# Patient Record
Sex: MALE | Race: WHITE | NOT HISPANIC OR LATINO | Employment: FULL TIME
[De-identification: names, ages, dates, MRNs, and addresses within clinical notes are randomized per-mention and may not be internally consistent; named-entity substitution may affect disease eponyms.]

---

## 2017-06-10 ENCOUNTER — HEALTH MAINTENANCE LETTER (OUTPATIENT)
Age: 22
End: 2017-06-10

## 2017-10-10 DIAGNOSIS — F41.9 ANXIETY: ICD-10-CM

## 2017-10-10 RX ORDER — BUPROPION HYDROCHLORIDE 150 MG/1
TABLET ORAL
Qty: 90 TABLET | Refills: 3 | OUTPATIENT
Start: 2017-10-10

## 2017-10-10 NOTE — TELEPHONE ENCOUNTER
This was sent for a year on 12/26/16.  Pt should have enough until 12/2017  Pt needs appt  Tisha Yi RN, BSN

## 2017-10-10 NOTE — TELEPHONE ENCOUNTER
Pending Prescriptions:                       Disp   Refills    buPROPion (WELLBUTRIN XL) 150 MG 24 hr ta*90 tab*2            Sig: TAKE 1 TABLET (150 MG) BY MOUTH EVERY MORNING    LAST OV 07/19/2016         Last Written Prescription Date: 12/26/2016  Last Fill Quantity: 90; # refills: 3  Last Office Visit with G, P or [facility] prescribing provider:  07/19/2016        Last PHQ-9 score on record=   PHQ-9 SCORE 7/17/2015   Total Score 6       Lab Results   Component Value Date    AST 31 09/08/2009     Lab Results   Component Value Date    ALT 17 09/08/2009     Naveed BARRERAT

## 2017-10-12 ENCOUNTER — TELEPHONE (OUTPATIENT)
Dept: FAMILY MEDICINE | Facility: CLINIC | Age: 22
End: 2017-10-12

## 2017-10-12 DIAGNOSIS — F41.9 ANXIETY: ICD-10-CM

## 2017-10-12 RX ORDER — BUPROPION HYDROCHLORIDE 150 MG/1
150 TABLET ORAL EVERY MORNING
Qty: 90 TABLET | Refills: 3 | Status: SHIPPED | OUTPATIENT
Start: 2017-10-12 | End: 2018-07-21

## 2018-02-15 DIAGNOSIS — F41.9 ANXIETY: ICD-10-CM

## 2018-02-15 RX ORDER — BUPROPION HYDROCHLORIDE 150 MG/1
TABLET ORAL
Qty: 90 TABLET | Refills: 3 | Status: SHIPPED | OUTPATIENT
Start: 2018-02-15 | End: 2018-07-21

## 2018-07-21 ENCOUNTER — OFFICE VISIT (OUTPATIENT)
Dept: FAMILY MEDICINE | Facility: CLINIC | Age: 23
End: 2018-07-21
Payer: COMMERCIAL

## 2018-07-21 DIAGNOSIS — Z23 NEED FOR PROPHYLACTIC VACCINATION WITH TETANUS-DIPHTHERIA (TD): ICD-10-CM

## 2018-07-21 DIAGNOSIS — Z13.1 SCREENING FOR DIABETES MELLITUS: ICD-10-CM

## 2018-07-21 DIAGNOSIS — F41.9 ANXIETY: ICD-10-CM

## 2018-07-21 DIAGNOSIS — Z00.00 ROUTINE GENERAL MEDICAL EXAMINATION AT A HEALTH CARE FACILITY: Primary | ICD-10-CM

## 2018-07-21 DIAGNOSIS — Z13.0 SCREENING FOR DISORDER OF BLOOD AND BLOOD-FORMING ORGANS: ICD-10-CM

## 2018-07-21 DIAGNOSIS — Z13.220 LIPID SCREENING: ICD-10-CM

## 2018-07-21 LAB
ALBUMIN SERPL-MCNC: 4.8 G/DL (ref 3.4–5)
ALP SERPL-CCNC: 62 U/L (ref 40–150)
ALT SERPL W P-5'-P-CCNC: 15 U/L (ref 0–70)
ANION GAP SERPL CALCULATED.3IONS-SCNC: 8 MMOL/L (ref 3–14)
AST SERPL W P-5'-P-CCNC: 9 U/L (ref 0–45)
BILIRUB SERPL-MCNC: 0.7 MG/DL (ref 0.2–1.3)
BUN SERPL-MCNC: 20 MG/DL (ref 7–30)
CALCIUM SERPL-MCNC: 9.5 MG/DL (ref 8.5–10.1)
CHLORIDE SERPL-SCNC: 104 MMOL/L (ref 94–109)
CHOLEST SERPL-MCNC: 179 MG/DL
CO2 SERPL-SCNC: 29 MMOL/L (ref 20–32)
CREAT SERPL-MCNC: 1.17 MG/DL (ref 0.66–1.25)
ERYTHROCYTE [DISTWIDTH] IN BLOOD BY AUTOMATED COUNT: 12.4 % (ref 10–15)
GFR SERPL CREATININE-BSD FRML MDRD: 78 ML/MIN/1.7M2
GLUCOSE SERPL-MCNC: 90 MG/DL (ref 70–99)
HCT VFR BLD AUTO: 44.3 % (ref 40–53)
HDLC SERPL-MCNC: 56 MG/DL
HGB BLD-MCNC: 15.5 G/DL (ref 13.3–17.7)
LDLC SERPL CALC-MCNC: 109 MG/DL
MCH RBC QN AUTO: 30.1 PG (ref 26.5–33)
MCHC RBC AUTO-ENTMCNC: 35 G/DL (ref 31.5–36.5)
MCV RBC AUTO: 86 FL (ref 78–100)
NONHDLC SERPL-MCNC: 123 MG/DL
PLATELET # BLD AUTO: 258 10E9/L (ref 150–450)
POTASSIUM SERPL-SCNC: 4.6 MMOL/L (ref 3.4–5.3)
PROT SERPL-MCNC: 8 G/DL (ref 6.8–8.8)
RBC # BLD AUTO: 5.15 10E12/L (ref 4.4–5.9)
SODIUM SERPL-SCNC: 141 MMOL/L (ref 133–144)
TRIGL SERPL-MCNC: 68 MG/DL
WBC # BLD AUTO: 6 10E9/L (ref 4–11)

## 2018-07-21 PROCEDURE — 90715 TDAP VACCINE 7 YRS/> IM: CPT | Performed by: PHYSICIAN ASSISTANT

## 2018-07-21 PROCEDURE — 90471 IMMUNIZATION ADMIN: CPT | Performed by: PHYSICIAN ASSISTANT

## 2018-07-21 PROCEDURE — 85027 COMPLETE CBC AUTOMATED: CPT | Performed by: PHYSICIAN ASSISTANT

## 2018-07-21 PROCEDURE — 99395 PREV VISIT EST AGE 18-39: CPT | Mod: 25 | Performed by: PHYSICIAN ASSISTANT

## 2018-07-21 PROCEDURE — 80061 LIPID PANEL: CPT | Performed by: PHYSICIAN ASSISTANT

## 2018-07-21 PROCEDURE — 36415 COLL VENOUS BLD VENIPUNCTURE: CPT | Performed by: PHYSICIAN ASSISTANT

## 2018-07-21 PROCEDURE — 80053 COMPREHEN METABOLIC PANEL: CPT | Performed by: PHYSICIAN ASSISTANT

## 2018-07-21 RX ORDER — BUPROPION HYDROCHLORIDE 150 MG/1
TABLET ORAL
Qty: 90 TABLET | Refills: 3 | Status: SHIPPED | OUTPATIENT
Start: 2018-07-21 | End: 2019-02-04

## 2018-07-21 ASSESSMENT — ANXIETY QUESTIONNAIRES
3. WORRYING TOO MUCH ABOUT DIFFERENT THINGS: NOT AT ALL
7. FEELING AFRAID AS IF SOMETHING AWFUL MIGHT HAPPEN: SEVERAL DAYS
GAD7 TOTAL SCORE: 3
IF YOU CHECKED OFF ANY PROBLEMS ON THIS QUESTIONNAIRE, HOW DIFFICULT HAVE THESE PROBLEMS MADE IT FOR YOU TO DO YOUR WORK, TAKE CARE OF THINGS AT HOME, OR GET ALONG WITH OTHER PEOPLE: NOT DIFFICULT AT ALL
2. NOT BEING ABLE TO STOP OR CONTROL WORRYING: NOT AT ALL
6. BECOMING EASILY ANNOYED OR IRRITABLE: SEVERAL DAYS
5. BEING SO RESTLESS THAT IT IS HARD TO SIT STILL: SEVERAL DAYS
1. FEELING NERVOUS, ANXIOUS, OR ON EDGE: NOT AT ALL

## 2018-07-21 ASSESSMENT — PATIENT HEALTH QUESTIONNAIRE - PHQ9: 5. POOR APPETITE OR OVEREATING: NOT AT ALL

## 2018-07-21 NOTE — PROGRESS NOTES
SUBJECTIVE:   CC: Sundeep Al is an 23 year old male who presents for preventative health visit.     Physical   Annual:     Getting at least 3 servings of Calcium per day:  Yes    Bi-annual eye exam:  NO    Dental care twice a year:  NO    Sleep apnea or symptoms of sleep apnea:  None    Diet:  Regular (no restrictions)    Frequency of exercise:  1 day/week    Duration of exercise:  15-30 minutes    Taking medications regularly:  Yes    Medication side effects:  None    Additional concerns today:  YES        Concern - blister  Onset: x 6 days    Description:   Blood blister from rock climbing    Intensity: mild    Progression of Symptoms:  same    Accompanying Signs & Symptoms:  none  Therapies Tried and outcome: bandage      Depression and Anxiety Follow-Up    Status since last visit: Improved     Other associated symptoms:None    Complicating factors:     Significant life event: No     Current substance abuse: none    No flowsheet data found.  MARYANA-7 SCORE 6/26/2015 7/17/2015   Total Score 10 6       PHQ-9  English  PHQ-9   Any Language  MARYANA-7  Suicide Assessment Five-step Evaluation and Treatment (SAFE-T)      Amount of exercise or physical activity: rock climbing;     Problems taking medications regularly: No    Medication side effects: none    Diet: regular (no restrictions)              Today's PHQ-2 Score:   PHQ-2 ( 1999 Pf[employer]) 7/21/2018   Q1: Little interest or pleasure in doing things 0   Q2: Feeling down, depressed or hopeless 0   PHQ-2 Score 0   Q1: Little interest or pleasure in doing things Not at all   Q2: Feeling down, depressed or hopeless Not at all   PHQ-2 Score 0       Abuse: Current or Past(Physical, Sexual or Emotional)- No  Do you feel safe in your environment - Yes    Social History   Substance Use Topics     Smoking status: Never Smoker     Smokeless tobacco: Never Used     Alcohol use No     Alcohol Use 7/21/2018   If you drink alcohol do you typically have greater than 3 drinks per day  OR greater than 7 drinks per week? No       Last PSA: No results found for: PSA    Reviewed orders with patient. Reviewed health maintenance and updated orders accordingly - Yes  BP Readings from Last 3 Encounters:   07/19/16 128/70   07/17/15 120/63   06/26/15 100/60    Wt Readings from Last 3 Encounters:   07/19/16 126 lb 3.2 oz (57.2 kg)   07/17/15 125 lb 9.6 oz (57 kg) (8 %)*   06/26/15 128 lb 3.2 oz (58.2 kg) (11 %)*     * Growth percentiles are based on [facility] 2-20 Years data.                    Reviewed and updated as needed this visit by clinical staff         Reviewed and updated as needed this visit by Provider            Review of Systems  CONSTITUTIONAL: NEGATIVE for fever, chills, change in weight  INTEGUMENTARY/SKIN: NEGATIVE for worrisome rashes, moles or lesions  EYES: NEGATIVE for vision changes or irritation  ENT: NEGATIVE for ear, mouth and throat problems  RESP: NEGATIVE for significant cough or SOB  CV: NEGATIVE for chest pain, palpitations or peripheral edema  GI: NEGATIVE for nausea, abdominal pain, heartburn, or change in bowel habits   male: negative for dysuria, hematuria, decreased urinary stream, erectile dysfunction, urethral discharge  MUSCULOSKELETAL: NEGATIVE for significant arthralgias or myalgia  NEURO: NEGATIVE for weakness, dizziness or paresthesias  PSYCHIATRIC: NEGATIVE for changes in mood or affect    OBJECTIVE:   There were no vitals taken for this visit.    Physical Exam  GENERAL: healthy, alert and no distress  EYES: Eyes grossly normal to inspection, PERRL and conjunctivae and sclerae normal  HENT: ear canals and TM's normal, nose and mouth without ulcers or lesions  NECK: no adenopathy, no asymmetry, masses, or scars and thyroid normal to palpation  RESP: lungs clear to auscultation - no rales, rhonchi or wheezes  CV: regular rate and rhythm, normal S1 S2, no S3 or S4, no murmur, click or rub, no peripheral edema and peripheral pulses strong  ABDOMEN: soft, nontender, no  "hepatosplenomegaly, no masses and bowel sounds normal  MS: no gross musculoskeletal defects noted, no edema  SKIN: no suspicious lesions or rashes  NEURO: Normal strength and tone, mentation intact and speech normal  PSYCH: mentation appears normal, affect normal/bright  LYMPH: no cervical, supraclavicular, axillary, or inguinal adenopathy    Diagnostic Test Results:  none     ASSESSMENT/PLAN:   1. Routine general medical examination at a health care facility      2. Need for prophylactic vaccination with tetanus-diphtheria (TD)    - TDAP VACCINE (ADACEL)  -      ADMIN VACCINE, FIRST    3. Anxiety  Stable and doing well   - buPROPion (WELLBUTRIN XL) 150 MG 24 hr tablet; TAKE 1 TABLET (150 MG) BY MOUTH EVERY MORNING  Dispense: 90 tablet; Refill: 3    4. Screening for diabetes mellitus    - Comprehensive metabolic panel    5. Lipid screening    - Lipid panel reflex to direct LDL Fasting    6. Screening for disorder of blood and blood-forming organs    - CBC with platelets    COUNSELING:   Reviewed preventive health counseling, as reflected in patient instructions       Regular exercise       Healthy diet/nutrition       Vision screening       Hearing screening       Aspirin Prophylaxsis    BP Readings from Last 1 Encounters:   07/19/16 128/70     Estimated body mass index is 18.5 kg/(m^2) as calculated from the following:    Height as of 7/19/16: 5' 9.25\" (1.759 m).    Weight as of 7/19/16: 126 lb 3.2 oz (57.2 kg).           reports that he has never smoked. He has never used smokeless tobacco.      Counseling Resources:  ATP IV Guidelines  Pooled Cohorts Equation Calculator  FRAX Risk Assessment  ICSI Preventive Guidelines  Dietary Guidelines for Americans, 2010  USDA's MyPlate  ASA Prophylaxis  Lung CA Screening    Ramona Ann Aaseby-Aguilera, PA-C  [facility]  Answers for HPI/ROS submitted by the patient on 7/21/2018   PHQ-2 Score: 0    "

## 2018-07-21 NOTE — PATIENT INSTRUCTIONS
Preventive Health Recommendations  Male Ages 21 - 25     Yearly exam:             See your health care provider every year in order to  o   Review health changes.   o   Discuss preventive care.    o   Review your medicines if your doctor has prescribed any.    You should be tested each year for STDs (sexually transmitted diseases).     Talk to your provider about cholesterol testing.      If you are at risk for diabetes, you should have a diabetes test (fasting glucose).    Shots: Get a flu shot each year. Get a tetanus shot every 10 years.     Nutrition:    Eat at least 5 servings of fruits and vegetables daily.     Eat whole-grain bread, whole-wheat pasta and brown rice instead of white grains and rice.     Get adequate calcium and Vitamin D.     Lifestyle    Exercise for at least 150 minutes a week (30 minutes a day, 5 days a week). This will help you control your weight and prevent disease.     Limit alcohol to one drink per day.     No smoking.     Wear sunscreen to prevent skin cancer.     See your dentist every six months for an exam and cleaning.

## 2018-07-21 NOTE — LETTER
"August 3, 2018      Sundeep Al  [address] [facility] [address]        Dear ,    We are writing to inform you of your test results. The results of your recent total cholesterol test were within normal limits.  Your total cholesterol should be less than 200.     The primary goal of therapy is the LDL or \"bad\" cholesterol.  Your LDL level was within normal limits.  Your LDL goal based on risk factors (i.e. smoking, family history, high blood pressure, low HDL cholesterol) and age is 160.     Your HDL, or \"good\" cholesterol is normal. Your goal is an HDL level above 40 if you are male and 50 if you are female     Triglycerides are another cholesterol component that is associated with heart disease. Normal triglycerides are less than 150. Your triglyceride level is normal.     I would recommend: no changes in current regimen. The rest of your labs are within acceptable limits.     Resulted Orders   Comprehensive metabolic panel   Result Value Ref Range    Sodium 141 133 - 144 mmol/L    Potassium 4.6 3.4 - 5.3 mmol/L    Chloride 104 94 - 109 mmol/L    Carbon Dioxide 29 20 - 32 mmol/L    Anion Gap 8 3 - 14 mmol/L    Glucose 90 70 - 99 mg/dL      Comment:      Fasting specimen    Urea Nitrogen 20 7 - 30 mg/dL    Creatinine 1.17 0.66 - 1.25 mg/dL    GFR Estimate 78 >60 mL/min/1.7m2      Comment:      Non  GFR Calc    GFR Estimate If Black >90 >60 mL/min/1.7m2      Comment:       GFR Calc    Calcium 9.5 8.5 - 10.1 mg/dL    Bilirubin Total 0.7 0.2 - 1.3 mg/dL    Albumin 4.8 3.4 - 5.0 g/dL    Protein Total 8.0 6.8 - 8.8 g/dL    Alkaline Phosphatase 62 40 - 150 U/L    ALT 15 0 - 70 U/L    AST 9 0 - 45 U/L   Lipid panel reflex to direct LDL Fasting   Result Value Ref Range    Cholesterol 179 <200 mg/dL    Triglycerides 68 <150 mg/dL      Comment:      Fasting specimen    HDL Cholesterol 56 >39 mg/dL    LDL Cholesterol Calculated 109 (H) <100 mg/dL      Comment:      " Above desirable:  100-129 mg/dl  Borderline High:  130-159 mg/dL  High:             160-189 mg/dL  Very high:       >189 mg/dl      Non HDL Cholesterol 123 <130 mg/dL   CBC with platelets   Result Value Ref Range    WBC 6.0 4.0 - 11.0 10e9/L    RBC Count 5.15 4.4 - 5.9 10e12/L    Hemoglobin 15.5 13.3 - 17.7 g/dL    Hematocrit 44.3 40.0 - 53.0 %    MCV 86 78 - 100 fl    MCH 30.1 26.5 - 33.0 pg    MCHC 35.0 31.5 - 36.5 g/dL    RDW 12.4 10.0 - 15.0 %    Platelet Count 258 150 - 450 10e9/L       If you have any questions or concerns, please call the clinic at the number listed above.       Sincerely,            Ramona Ann Aaseby-Aguilera, PA-C

## 2018-07-21 NOTE — MR AVS SNAPSHOT
After Visit Summary   7/21/2018    Sundeep Al    MRN: [id number]           Patient Information     Date Of Birth          1995        Visit Information        Provider Department      7/21/2018 8:30 AM Aaseby-Aguilera, Ramona Ann, PA-C [facility]        Today's Diagnoses     Routine general medical examination at a health care facility    -  1    Need for prophylactic vaccination with tetanus-diphtheria (TD)        Anxiety        Screening for diabetes mellitus        Lipid screening        Screening for disorder of blood and blood-forming organs          Care Instructions      Preventive Health Recommendations  Male Ages 21 - 25     Yearly exam:             See your health care provider every year in order to  o   Review health changes.   o   Discuss preventive care.    o   Review your medicines if your doctor has prescribed any.    You should be tested each year for STDs (sexually transmitted diseases).     Talk to your provider about cholesterol testing.      If you are at risk for diabetes, you should have a diabetes test (fasting glucose).    Shots: Get a flu shot each year. Get a tetanus shot every 10 years.     Nutrition:    Eat at least 5 servings of fruits and vegetables daily.     Eat whole-grain bread, whole-wheat pasta and brown rice instead of white grains and rice.     Get adequate calcium and Vitamin D.     Lifestyle    Exercise for at least 150 minutes a week (30 minutes a day, 5 days a week). This will help you control your weight and prevent disease.     Limit alcohol to one drink per day.     No smoking.     Wear sunscreen to prevent skin cancer.     See your dentist every six months for an exam and cleaning.             Follow-ups after your visit        Follow-up notes from your care team     Return in about 1 year (around 7/21/2019) for Physical Exam.      Who to contact     If you have questions or need follow up information about today's clinic  visit or your schedule please contact [facility] directly at [phone].  Normal or non-critical lab and imaging results will be communicated to you by MyChart, letter or phone within 4 business days after the clinic has received the results. If you do not hear from us within 7 days, please contact the clinic through MyChart or phone. If you have a critical or abnormal lab result, we will notify you by phone as soon as possible.  Submit refill requests through [employer] or call your pharmacy and they will forward the refill request to us. Please allow 3 business days for your refill to be completed.          Additional Information About Your Visit        Care EveryWhere ID     This is your Care EveryWhere ID. This could be used by other organizations to access your [place] medical records  [id number]         Blood Pressure from Last 3 Encounters:   07/19/16 128/70   07/17/15 120/63   06/26/15 100/60    Weight from Last 3 Encounters:   07/19/16 126 lb 3.2 oz (57.2 kg)   07/17/15 125 lb 9.6 oz (57 kg) (8 %)*   06/26/15 128 lb 3.2 oz (58.2 kg) (11 %)*     * Growth percentiles are based on CDC 2-20 Years data.              We Performed the Following          ADMIN VACCINE, FIRST     CBC with platelets     Comprehensive metabolic panel     Lipid panel reflex to direct LDL Fasting     TDAP VACCINE (ADACEL)          Today's Medication Changes          These changes are accurate as of 7/21/18  8:47 AM.  If you have any questions, ask your nurse or doctor.               These medicines have changed or have updated prescriptions.        Dose/Directions    buPROPion 150 MG 24 hr tablet   Commonly known as:  WELLBUTRIN XL   This may have changed:  Another medication with the same name was removed. Continue taking this medication, and follow the directions you see here.   Used for:  Anxiety        TAKE 1 TABLET (150 MG) BY MOUTH EVERY MORNING   Quantity:  90 tablet   Refills:  3            Where to get your  medicines      These medications were sent to [facility] [address] [place] [facility] [address] [facility]  [address] [facility] [address]    Hours:  Tech issues with their phone system Phone:  [phone]     buPROPion 150 MG 24 hr tablet                Primary Care Provider Office Phone # Fax #    Neelam Ann Aaseby-Aguilera, PA-C [phone] [address]  [facility] [address]        Equal Access to Services     KEVON HAYNES : Hadii aad ku hadasho Soomaali, waaxda luqadaha, qaybta kaalmada adeegyada, waxay idiin hayaan adeeg kharash lajohn . [facility] [phone].    ATENCIÓN: Si habla español, tiene a maldonado disposición servicios gratuitos de asistencia lingüística. [facility] [phone].    We comply with applicable federal civil rights laws and [place] laws. We do not discriminate on the basis of race, color, national origin, age, disability, sex, sexual orientation, or gender identity.            Thank you!     Thank you for choosing [facility]  for your care. Our goal is always to provide you with excellent care. Hearing back from our patients is one way we can continue to improve our services. Please take a few minutes to complete the written survey that you may receive in the mail after your visit with us. Thank you!             Your Updated Medication List - Protect others around you: Learn how to safely use, store and throw away your medicines at www.disposemymeds.org.          This list is accurate as of 7/21/18  8:47 AM.  Always use your most recent med list.                   Brand Name Dispense Instructions for use Diagnosis    buPROPion 150 MG 24 hr tablet    WELLBUTRIN XL    90 tablet    TAKE 1 TABLET (150 MG) BY MOUTH EVERY MORNING    Anxiety

## 2018-07-22 ASSESSMENT — ANXIETY QUESTIONNAIRES: GAD7 TOTAL SCORE: 3

## 2018-07-22 ASSESSMENT — PATIENT HEALTH QUESTIONNAIRE - PHQ9: SUM OF ALL RESPONSES TO PHQ QUESTIONS 1-9: 5

## 2018-08-03 NOTE — PROGRESS NOTES
"Dear Sundeep,    It was a pleasure to see you at your recent visit.      Patient Active Problem List:     Growth failure     Mild major depression (H)     Anxiety        The results of your recent total cholesterol test were within normal limits.  Your total cholesterol should be less than 200.    The primary goal of therapy is the LDL or \"bad\" cholesterol.  Your LDL level was within normal limits.  Your LDL goal based on risk factors (i.e. smoking, family history, high blood pressure, low HDL cholesterol) and age is 160.    Your HDL, or \"good\" cholesterol is normal.  Your goal is an HDL level above 40 if you are male and 50 if you are female    Triglycerides are another cholesterol component that is associated with heart disease.  Normal triglycerides are less than 150.  Your   triglyceride level is normal.    I would recommend: no changes in current regimen.        The rest of your labs are within acceptable limits :     Results for orders placed or performed in visit on 07/21/18  -Comprehensive metabolic panel       Result                                            Value                         Ref Range                       Sodium                                            141                           133 - 144 mmol/L                Potassium                                         4.6                           3.4 - 5.3 mmol/L                Chloride                                          104                           94 - 109 mmol/L                 Carbon Dioxide                                    29                            20 - 32 mmol/L                  Anion Gap                                         8                             3 - 14 mmol/L                   Glucose                                           90                            70 - 99 mg/dL                   Urea Nitrogen                                     20                            7 - 30 mg/dL                    Creatinine          "                               1.17                          0.66 - 1.25 mg/dL               GFR Estimate                                      78                            >60 mL/min/1.7m2                GFR Estimate If Black                             >90                           >60 mL/min/1.7m2                Calcium                                           9.5                           8.5 - 10.1 mg/dL                Bilirubin Total                                   0.7                           0.2 - 1.3 mg/dL                 Albumin                                           4.8                           3.4 - 5.0 g/dL                  Protein Total                                     8.0                           6.8 - 8.8 g/dL                  Alkaline Phosphatase                              62                            40 - 150 U/L                    ALT                                               15                            0 - 70 U/L                      AST                                               9                             0 - 45 U/L                 -Lipid panel reflex to direct LDL Fasting       Result                                            Value                         Ref Range                       Cholesterol                                       179                           <200 mg/dL                      Triglycerides                                     68                            <150 mg/dL                      HDL Cholesterol                                   56                            >39 mg/dL                       LDL Cholesterol Calculated                        109 (H)                       <100 mg/dL                      Non HDL Cholesterol                               123                           <130 mg/dL                 -CBC with platelets       Result                                            Value                         Ref Range                        WBC                                               6.0                           4.0 - 11.0 10e9/L               RBC Count                                         5.15                          4.4 - 5.9 10e12/L               Hemoglobin                                        15.5                          13.3 - 17.7 g/dL                Hematocrit                                        44.3                          40.0 - 53.0 %                   MCV                                               86                            78 - 100 fl                     MCH                                               30.1                          26.5 - 33.0 pg                  MCHC                                              35.0                          31.5 - 36.5 g/dL                RDW                                               12.4                          10.0 - 15.0 %                   Platelet Count                                    258                           150 - 450 10e9/L               Thank you for choosing [facility]. We appreciate the opportunity to serve   you and look forward to supporting your healthcare needs in the future.    If you have any questions or concerns, please contact us at [phone]      Sincerely,        Ramona Aaseby-Aguilera PA-C          TEST DESCRIPTIONS   CBC (Complete Blood Coun  t) includes hemoglobin, hematocrit, white blood cells, etc. This test can be used to detect anemia, infection, and abnormalities in blood cells.   Cholesterol is one of the blood fats (lipids) and is the building block used by the body for cell wall and   hormone production. Increased levels of cholesterol have been proven to directly contribute to heart disease and strokes.   Triglycerides are one of the blood fats (lipids) and are thought to be associated with heart disease. If you have had anything to   eat or drink other than water for 12 hours before the test and your level is high,  "you should have the test repeated after a 12 hour fast. Abnormally high results may also be associated with diabetes, kidney and liver diseases.   LDL is the low density l  ipoprotein component of the cholesterol. It is the harmful substance that deposits cholesterol on the artery walls contributing to heart attacks and strokes. A high LDL level is associated with higher risk of coronary heart disease.   HDL stands for high   density lipoprotein and refers to the so-called \"good\" cholesterol. HDL picks up excess cholesterol in the bloodstream and carries it back to the liver for disposal. Individuals with higher than average HDL seem to have a lower risk of coronary disease.   Vigorous exercise will help increase the blood levels of HDL.   Risk Factor (Total cholesterol/HDL) is a commonly used ratio for cardiac risk assessment. Less than 5.0 for men and 4.4 for women is ideal.   Sodium is an electrolyte useful in diagnosis of   dehydration, diabetes, hypertension, or other diseases involving electrolyte imbalance. It also preserves the balance between calcium and potassium to maintain normal heart action and equilibrium of the body.   Potassium is also an electrolyte that work  s with sodium to regulate the body's water balance and normalize heart rhythm.   Glucose is a measure of blood sugar and is one of the tests for diabetes. If you have not been fasting, your level will often be high. A low glucose level may be a cause of   weakness or dizziness. Blood sugar ranks with cholesterol as a causative factor in arteriosclerosis and heart attacks.   BUN & Creatinine are waste products excreted by the kidneys. A high BUN can be related to a high protein diet, heavy exercise, fever   and infections, dehydration, kidney stones, and kidney disease. Creatinine elevation is less dependent on diet or exercise and better represents kidney impairment. Low values are not generally significant.   Calcium is a mineral in the " blood controlled b  y the parathyroid glands and kidneys. It is important in the formation of bone, in muscle and nerve function, and in blood clotting. Disease of the parathyroid gland, diseased bones or kidneys, or defective absorption of calcium may cause abnormal levels   from the intestine.   ALT, AST, Alk Phos are liver tests. Enzymes found in the liver as well as skeletal and cardiac muscle. Elevations can often be seen in alcoholism, liver or heart disease. Slightly abnormal values are not considered significant.   T  SH stands for Thyroid Stimulating Hormone. A sensitive test used to determine how the thyroid gland is functioning. The thyroid gland produces hormones that control the body's metabolism. When too few hormones are produced (increased TSH), hypothyroidism   occurs which can cause fatigue, sensitivity to cold, and weight gain - an overall slowing down of bodily functions. When too many thyroid hormones are produces (or decreased TSH), it creates a condition call hyperthyroidism (such as Graves' disease) whi  ch causes rapid heartbeat, weight loss, and dizziness, among other symptoms.   PSA stands for Prostate Specific Antigen. Elevated levels of PSA can increase with trauma, infection, inflammation, or disease processes in the prostate such as BPH (Benigh Pr  ostatic Hypertrophy) or cancer.   Glycohemoglobin or HgBA1C is a 3-month average of blood sugar.

## 2019-02-04 DIAGNOSIS — F41.9 ANXIETY: ICD-10-CM

## 2019-02-04 RX ORDER — BUPROPION HYDROCHLORIDE 150 MG/1
TABLET ORAL
Qty: 90 TABLET | Refills: 3 | Status: SHIPPED | OUTPATIENT
Start: 2019-02-04 | End: 2020-02-29

## 2019-04-24 ENCOUNTER — TELEPHONE (OUTPATIENT)
Dept: FAMILY MEDICINE | Facility: CLINIC | Age: 24
End: 2019-04-24

## 2019-04-24 DIAGNOSIS — Z29.89 NEED FOR MALARIA PROPHYLAXIS: Primary | ICD-10-CM

## 2019-04-24 NOTE — TELEPHONE ENCOUNTER
Pt will be traveling and request RX for milaria    Please sign if appropriate.     Enriqueta Osborn RN

## 2019-04-26 RX ORDER — ATOVAQUONE AND PROGUANIL HYDROCHLORIDE 250; 100 MG/1; MG/1
1 TABLET, FILM COATED ORAL DAILY
Qty: 21 TABLET | Refills: 0 | Status: SHIPPED | OUTPATIENT
Start: 2019-04-26 | End: 2021-05-20

## 2020-02-29 ENCOUNTER — OFFICE VISIT (OUTPATIENT)
Dept: FAMILY MEDICINE | Facility: CLINIC | Age: 25
End: 2020-02-29
Payer: COMMERCIAL

## 2020-02-29 VITALS
OXYGEN SATURATION: 98 % | WEIGHT: 145.8 LBS | HEART RATE: 84 BPM | HEIGHT: 70 IN | RESPIRATION RATE: 16 BRPM | BODY MASS INDEX: 20.87 KG/M2 | SYSTOLIC BLOOD PRESSURE: 134 MMHG | DIASTOLIC BLOOD PRESSURE: 75 MMHG | TEMPERATURE: 98 F

## 2020-02-29 DIAGNOSIS — Z00.00 ROUTINE GENERAL MEDICAL EXAMINATION AT A HEALTH CARE FACILITY: Primary | ICD-10-CM

## 2020-02-29 DIAGNOSIS — F41.9 ANXIETY: ICD-10-CM

## 2020-02-29 PROCEDURE — 90686 IIV4 VACC NO PRSV 0.5 ML IM: CPT | Performed by: PHYSICIAN ASSISTANT

## 2020-02-29 PROCEDURE — 90471 IMMUNIZATION ADMIN: CPT | Performed by: PHYSICIAN ASSISTANT

## 2020-02-29 PROCEDURE — 99395 PREV VISIT EST AGE 18-39: CPT | Mod: 25 | Performed by: PHYSICIAN ASSISTANT

## 2020-02-29 ASSESSMENT — ENCOUNTER SYMPTOMS
PARESTHESIAS: 0
HEMATURIA: 0
HEARTBURN: 0
EYE PAIN: 0
FREQUENCY: 0
PALPITATIONS: 0
NERVOUS/ANXIOUS: 0
CHILLS: 0
DYSURIA: 0
MYALGIAS: 0
ARTHRALGIAS: 0
ABDOMINAL PAIN: 0
CONSTIPATION: 0
JOINT SWELLING: 0
FEVER: 0
HEADACHES: 0
COUGH: 0
WEAKNESS: 0
DIZZINESS: 0
SORE THROAT: 0
HEMATOCHEZIA: 0
NAUSEA: 0
SHORTNESS OF BREATH: 0
DIARRHEA: 0

## 2020-02-29 ASSESSMENT — ANXIETY QUESTIONNAIRES
2. NOT BEING ABLE TO STOP OR CONTROL WORRYING: NOT AT ALL
7. FEELING AFRAID AS IF SOMETHING AWFUL MIGHT HAPPEN: NOT AT ALL
1. FEELING NERVOUS, ANXIOUS, OR ON EDGE: NOT AT ALL
6. BECOMING EASILY ANNOYED OR IRRITABLE: SEVERAL DAYS
GAD7 TOTAL SCORE: 1
5. BEING SO RESTLESS THAT IT IS HARD TO SIT STILL: NOT AT ALL
IF YOU CHECKED OFF ANY PROBLEMS ON THIS QUESTIONNAIRE, HOW DIFFICULT HAVE THESE PROBLEMS MADE IT FOR YOU TO DO YOUR WORK, TAKE CARE OF THINGS AT HOME, OR GET ALONG WITH OTHER PEOPLE: NOT DIFFICULT AT ALL
3. WORRYING TOO MUCH ABOUT DIFFERENT THINGS: NOT AT ALL

## 2020-02-29 ASSESSMENT — MIFFLIN-ST. JEOR: SCORE: 1649.65

## 2020-02-29 ASSESSMENT — PATIENT HEALTH QUESTIONNAIRE - PHQ9
SUM OF ALL RESPONSES TO PHQ QUESTIONS 1-9: 1
5. POOR APPETITE OR OVEREATING: NOT AT ALL

## 2020-02-29 NOTE — PROGRESS NOTES
SUBJECTIVE:   CC: Sundeep Al is an 25 year old male who presents for preventative health visit.     Healthy Habits:     Getting at least 3 servings of Calcium per day:  Yes    Bi-annual eye exam:  Yes    Dental care twice a year:  NO    Sleep apnea or symptoms of sleep apnea:  None    Diet:  Regular (no restrictions)    Frequency of exercise:  4-5 days/week    Duration of exercise:  45-60 minutes    Taking medications regularly:  Yes    Medication side effects:  Not applicable and None    PHQ-2 Total Score: 0    Additional concerns today:  No          No concerns     Today's PHQ-2 Score:   PHQ-2 ( 1999 Pfizer) 2/29/2020   Q1: Little interest or pleasure in doing things 0   Q2: Feeling down, depressed or hopeless 0   PHQ-2 Score 0   Q1: Little interest or pleasure in doing things Not at all   Q2: Feeling down, depressed or hopeless Not at all   PHQ-2 Score 0       Abuse: Current or Past(Physical, Sexual or Emotional)- No  Do you feel safe in your environment? Yes        Social History     Tobacco Use     Smoking status: Never Smoker     Smokeless tobacco: Never Used   Substance Use Topics     Alcohol use: No         Alcohol Use 2/29/2020   Prescreen: >3 drinks/day or >7 drinks/week? Not Applicable   Prescreen: >3 drinks/day or >7 drinks/week? -       Last PSA: No results found for: PSA    Reviewed orders with patient. Reviewed health maintenance and updated orders accordingly - Yes  BP Readings from Last 3 Encounters:   02/29/20 134/75   07/19/16 128/70   07/17/15 120/63    Wt Readings from Last 3 Encounters:   02/29/20 66.1 kg (145 lb 12.8 oz)   07/19/16 57.2 kg (126 lb 3.2 oz)   07/17/15 57 kg (125 lb 9.6 oz) (8 %)*     * Growth percentiles are based on CDC (Boys, 2-20 Years) data.                  Current Outpatient Medications   Medication Sig Dispense Refill     atovaquone-proguanil (MALARONE) 250-100 MG tablet Take 1 tablet by mouth daily Start 2 days before travel and continue 7 days after return. 21  tablet 0     Recent Labs   Lab Test 07/21/18  0855 06/26/15  0951   *  --    HDL 56  --    TRIG 68  --    ALT 15  --    CR 1.17  --    GFRESTIMATED 78  --    GFRESTBLACK >90  --    POTASSIUM 4.6  --    TSH  --  1.32        Reviewed and updated as needed this visit by clinical staff  Tobacco  Allergies  Meds  Med Hx  Surg Hx  Fam Hx  Soc Hx        Reviewed and updated as needed this visit by Provider            Review of Systems   Constitutional: Negative for chills and fever.   HENT: Negative for congestion, ear pain, hearing loss and sore throat.    Eyes: Negative for pain and visual disturbance.   Respiratory: Negative for cough and shortness of breath.    Cardiovascular: Negative for chest pain, palpitations and peripheral edema.   Gastrointestinal: Negative for abdominal pain, constipation, diarrhea, heartburn, hematochezia and nausea.   Genitourinary: Negative for discharge, dysuria, frequency, genital sores, hematuria, impotence and urgency.   Musculoskeletal: Negative for arthralgias, joint swelling and myalgias.   Skin: Negative for rash.   Neurological: Negative for dizziness, weakness, headaches and paresthesias.   Psychiatric/Behavioral: Negative for mood changes. The patient is not nervous/anxious.      CONSTITUTIONAL: NEGATIVE for fever, chills, change in weight  INTEGUMENTARY/SKIN: NEGATIVE for worrisome rashes, moles or lesions  EYES: NEGATIVE for vision changes or irritation  ENT: NEGATIVE for ear, mouth and throat problems  RESP: NEGATIVE for significant cough or SOB  CV: NEGATIVE for chest pain, palpitations or peripheral edema  GI: NEGATIVE for nausea, abdominal pain, heartburn, or change in bowel habits   male: negative for dysuria, hematuria, decreased urinary stream, erectile dysfunction, urethral discharge  MUSCULOSKELETAL: NEGATIVE for significant arthralgias or myalgia  NEURO: NEGATIVE for weakness, dizziness or paresthesias  PSYCHIATRIC: NEGATIVE for changes in mood or  "affect    OBJECTIVE:   /75 (BP Location: Right arm, Patient Position: Chair, Cuff Size: Adult Regular)   Pulse 84   Temp 98  F (36.7  C) (Oral)   Resp 16   Ht 1.765 m (5' 9.5\")   Wt 66.1 kg (145 lb 12.8 oz)   SpO2 98%   BMI 21.22 kg/m      Physical Exam  GENERAL: healthy, alert and no distress  EYES: Eyes grossly normal to inspection, PERRL and conjunctivae and sclerae normal  HENT: ear canals and TM's normal, nose and mouth without ulcers or lesions  NECK: no adenopathy, no asymmetry, masses, or scars and thyroid normal to palpation  RESP: lungs clear to auscultation - no rales, rhonchi or wheezes  CV: regular rate and rhythm, normal S1 S2, no S3 or S4, no murmur, click or rub, no peripheral edema and peripheral pulses strong  ABDOMEN: soft, nontender, no hepatosplenomegaly, no masses and bowel sounds normal   (male): normal male genitalia without lesions or urethral discharge, no hernia  MS: no gross musculoskeletal defects noted, no edema  SKIN: no suspicious lesions or rashes  NEURO: Normal strength and tone, mentation intact and speech normal  PSYCH: mentation appears normal, affect normal/bright    Diagnostic Test Results:  Labs reviewed in Epic    ASSESSMENT/PLAN:   1. Routine general medical examination at a health care facility        COUNSELING:   Reviewed preventive health counseling, as reflected in patient instructions       Regular exercise       Healthy diet/nutrition       Vision screening       Hearing screening    Estimated body mass index is 21.22 kg/m  as calculated from the following:    Height as of this encounter: 1.765 m (5' 9.5\").    Weight as of this encounter: 66.1 kg (145 lb 12.8 oz).          reports that he has never smoked. He has never used smokeless tobacco.      Counseling Resources:  ATP IV Guidelines  Pooled Cohorts Equation Calculator  FRAX Risk Assessment  ICSI Preventive Guidelines  Dietary Guidelines for Americans, 2010  USDA's MyPlate  ASA Prophylaxis  Lung CA " Screening    Ramona Ann Aaseby-Aguilera, PA-C  [facility]

## 2020-03-01 ASSESSMENT — ANXIETY QUESTIONNAIRES: GAD7 TOTAL SCORE: 1

## 2020-03-19 ENCOUNTER — ANCILLARY PROCEDURE (OUTPATIENT)
Dept: GENERAL RADIOLOGY | Facility: CLINIC | Age: 25
End: 2020-03-19
Attending: NURSE PRACTITIONER
Payer: COMMERCIAL

## 2020-03-19 ENCOUNTER — OFFICE VISIT (OUTPATIENT)
Dept: FAMILY MEDICINE | Facility: CLINIC | Age: 25
End: 2020-03-19
Payer: COMMERCIAL

## 2020-03-19 VITALS
OXYGEN SATURATION: 97 % | TEMPERATURE: 97.4 F | SYSTOLIC BLOOD PRESSURE: 108 MMHG | BODY MASS INDEX: 21.11 KG/M2 | DIASTOLIC BLOOD PRESSURE: 64 MMHG | WEIGHT: 145 LBS | HEART RATE: 66 BPM

## 2020-03-19 DIAGNOSIS — M25.532 ACUTE PAIN OF LEFT WRIST: ICD-10-CM

## 2020-03-19 DIAGNOSIS — M79.641 PAIN OF RIGHT HAND: Primary | ICD-10-CM

## 2020-03-19 DIAGNOSIS — M25.531 RIGHT WRIST PAIN: ICD-10-CM

## 2020-03-19 DIAGNOSIS — M79.641 PAIN OF RIGHT HAND: ICD-10-CM

## 2020-03-19 PROCEDURE — 73110 X-RAY EXAM OF WRIST: CPT | Mod: RT

## 2020-03-19 PROCEDURE — 73110 X-RAY EXAM OF WRIST: CPT | Mod: LT

## 2020-03-19 PROCEDURE — 99213 OFFICE O/P EST LOW 20 MIN: CPT | Performed by: NURSE PRACTITIONER
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
## 2021-03-02 ENCOUNTER — TELEPHONE (OUTPATIENT)
Dept: FAMILY MEDICINE | Facility: CLINIC | Age: 26
End: 2021-03-02

## 2021-03-02 NOTE — TELEPHONE ENCOUNTER
Pt has rash on penis.  Has been there for some time.     Mateo any burning with urination, no discharge from penis.  No swelling, redness or pain.       Would like to be seen with JQ.   Can be be added in to any of the below    3/3 approval 1130    3/4 20 min at 820    3/5 approval from 11-12     3/8 20 min at 10:10 or approval at 11     Per provider ok for 20 min on 3/8     Message handled by Nurse Triage with [facility] - provider name: Delmar Araujo MD.    Enriqueta Osborn, RN

## 2021-03-08 ENCOUNTER — OFFICE VISIT (OUTPATIENT)
Dept: FAMILY MEDICINE | Facility: CLINIC | Age: 26
End: 2021-03-08
Payer: COMMERCIAL

## 2021-03-08 VITALS
DIASTOLIC BLOOD PRESSURE: 84 MMHG | SYSTOLIC BLOOD PRESSURE: 128 MMHG | WEIGHT: 154 LBS | TEMPERATURE: 98.2 F | HEIGHT: 69 IN | OXYGEN SATURATION: 99 % | BODY MASS INDEX: 22.81 KG/M2 | HEART RATE: 66 BPM | RESPIRATION RATE: 18 BRPM

## 2021-03-08 DIAGNOSIS — N48.89 PENILE ADHESIONS W/SKIN BRIDGING: Primary | ICD-10-CM

## 2021-03-08 PROCEDURE — 99213 OFFICE O/P EST LOW 20 MIN: CPT | Performed by: FAMILY MEDICINE

## 2021-03-08 ASSESSMENT — PATIENT HEALTH QUESTIONNAIRE - PHQ9
10. IF YOU CHECKED OFF ANY PROBLEMS, HOW DIFFICULT HAVE THESE PROBLEMS MADE IT FOR YOU TO DO YOUR WORK, TAKE CARE OF THINGS AT HOME, OR GET ALONG WITH OTHER PEOPLE: SOMEWHAT DIFFICULT
SUM OF ALL RESPONSES TO PHQ QUESTIONS 1-9: 1
SUM OF ALL RESPONSES TO PHQ QUESTIONS 1-9: 1

## 2021-03-08 ASSESSMENT — MIFFLIN-ST. JEOR: SCORE: 1673.92

## 2021-03-08 NOTE — PROGRESS NOTES
"    Assessment & Plan     Penile adhesions w/skin bridging  Recommend urology follow-up for consideration of procedure to remove skin bridge given symptoms.  - UROLOGY ADULT REFERRAL; Future                 Return in about 3 years (around 3/8/2024) for preventative care visit.    Delmar Araujo MD  [facility] YANETH Urbano is a 26 year old who presents for the following health issues     History of Present Illness       He eats 2-3 servings of fruits and vegetables daily.He consumes 0 sweetened beverage(s) daily.He exercises with enough effort to increase his heart rate 60 or more minutes per day.  He exercises with enough effort to increase his heart rate 4 days per week.   He is taking medications regularly.     Patient with skin bridging at dorsal glans penis but gets painful and irritated with erection.    Review of Systems         Objective    /84 (BP Location: Right arm, Patient Position: Chair, Cuff Size: Adult Regular)   Pulse 66   Temp 98.2  F (36.8  C) (Oral)   Resp 18   Ht 1.753 m (5' 9\")   Wt 69.9 kg (154 lb)   SpO2 99%   BMI 22.74 kg/m    Body mass index is 22.74 kg/m .  Physical Exam   GENERAL: healthy, alert and no distress   (male): testicles normal without atrophy or masses, no hernias, penis normal without urethral discharge and narrow area of skin bridging dorsal penis                "

## 2021-05-20 ENCOUNTER — OFFICE VISIT (OUTPATIENT)
Dept: UROLOGY | Facility: CLINIC | Age: 26
End: 2021-05-20
Payer: COMMERCIAL

## 2021-05-20 VITALS
HEIGHT: 69 IN | WEIGHT: 152 LBS | BODY MASS INDEX: 22.51 KG/M2 | SYSTOLIC BLOOD PRESSURE: 92 MMHG | DIASTOLIC BLOOD PRESSURE: 62 MMHG

## 2021-05-20 DIAGNOSIS — N48.9: Primary | ICD-10-CM

## 2021-05-20 PROCEDURE — 99203 OFFICE O/P NEW LOW 30 MIN: CPT | Performed by: STUDENT IN AN ORGANIZED HEALTH CARE EDUCATION/TRAINING PROGRAM

## 2021-05-20 ASSESSMENT — PAIN SCALES - GENERAL: PAINLEVEL: NO PAIN (0)

## 2021-05-20 ASSESSMENT — MIFFLIN-ST. JEOR: SCORE: 1664.85

## 2021-05-20 NOTE — LETTER
5/20/2021       RE: Sundeep Al  [address] [facility] [address]     Dear Colleague,    Thank you for referring your patient, Sundeep Al, to the [facility] UROLOGY CLINIC [place] at [facility]. Please see a copy of my visit note below.          Chief Complaint:   Painful erections, skin bridge on glans penis           Consult or Referral:     Mr. Sundeep Al is a 26 year old male seen at the request of  No ref. provider found.         History of Present Illness:     Sundeep Al is a 26 year old male being seen for penile skin bridge.  Duration of problem: Many years  Previous treatments: None    Sundeep has been having difficulties during intercourse and review of the skin bridge which he believes has been there since his childhood.  It becomes more difficult with erections and penetration.  He wants to have a permanent solution for that         Past Medical History:     Past Medical History:   Diagnosis Date     NO ACTIVE PROBLEMS             Past Surgical History:     Past Surgical History:   Procedure Laterality Date     OPEN REDUCTION INTERNAL FIXATION FOREARM              Medications     No current outpatient medications on file.     No current facility-administered medications for this visit.             Family History:     Family History   Problem Relation Age of Onset     Family History Negative Mother      Family History Negative Father      Cancer Paternal Grandfather         prostate     Family History Negative Sister         1     Heart Disease No family hx of      Lipids No family hx of             Social History:     Social History     Socioeconomic History     Marital status: Single     Spouse name: Not on file     Number of children: Not on file     Years of education: Not on file     Highest education level: Not on file   Occupational History     Not on file   Social Needs     Financial resource strain: Not  "on file     Food insecurity     Worry: Not on file     Inability: Not on file     Transportation needs     Medical: Not on file     Non-medical: Not on file   Tobacco Use     Smoking status: Never Smoker     Smokeless tobacco: Never Used   Substance and Sexual Activity     Alcohol use: Yes     Comment: rare     Drug use: No     Sexual activity: Yes   Lifestyle     Physical activity     Days per week: Not on file     Minutes per session: Not on file     Stress: Not on file   Relationships     Social connections     Talks on phone: Not on file     Gets together: Not on file     Attends [employer] service: Not on file     Active member of club or organization: Not on file     Attends meetings of clubs or organizations: Not on file     Relationship status: Not on file     Intimate partner violence     Fear of current or ex partner: Not on file     Emotionally abused: Not on file     Physically abused: Not on file     Forced sexual activity: Not on file   Other Topics Concern     Parent/sibling w/ CABG, MI or angioplasty before 65F 55M? No   Social History Narrative     Not on file            Allergies:   Patient has no known allergies.         Review of Systems:  From intake questionnaire     Skin: negative  Eyes: negative  Ears/Nose/Throat: negative  Respiratory: No shortness of breath, dyspnea on exertion, cough, or hemoptysis  Cardiovascular: No chest pain or palpitations  Gastrointestinal: negative; no nausea/vomiting, constipation or diarrhea  Genitourinary: as per HPI  Musculoskeletal: negative  Neurologic: negative  Psychiatric: negative  Hematologic/Lymphatic/Immunologic: negative  Endocrine: negative         Physical Exam:     Patient is a 26 year old  male   Vitals: Blood pressure 92/62, height 1.753 m (5' 9\"), weight 68.9 kg (152 lb).  Constitutional: Body mass index is 22.45 kg/m .  Alert, no acute distress, oriented, conversant  Eyes: no scleral icterus; extraocular muscles intact, moist conjunctivae  Neck: " trachea midline, no thyromegaly  Ears/nose/mouth: throat/mouth:normal, good dentition  Respiratory: no respiratory distress, or pursed lip breathing  Cardiovascular: pulses strong and intact; no obvious jugular venous distension present  Gastrointestinal: soft, nontender, no organomegaly or masses,   Lymphatics: No inguinal adenopathy  Musculoskeletal: extremities normal, no peripheral edema  Skin: no suspicious lesions or rashes  Neuro: Alert, oriented, speech and mentation normal  Psych: affect and mood normal, alert and oriented to person, place and time  Gait: Normal  : Circumcised penis skin bridge on the dorsal aspect of the dense.      Labs and Pathology:    The following labs were reviewed by me and discussed with the patient:    Significant for   Lab Results   Component Value Date    CR 1.17 07/21/2018    CR 0.66 09/08/2009     No results found for: PSA           Assessment and Plan:     Acquired synechiae of foreskin of penis  In view of the difficulty that Sundeep is facing with signing he resulting in that skin bridge will be discussed about the option of removing that under local anesthesia.  We could do that our next clinic visit.  I explained to Sundeep the procedure of penile block with excision of that skin bridge and suturing with reabsorbable sutures.  He is agreeable to the procedure and will schedule a time according to his availability.      Plan:  Excision of skin bridge    Orders  No orders of the defined types were placed in this encounter.      Mitchell Cruz MD  [facility] UROLOGY CLINIC [place]      ==========================      25 minutes spent on the date of the encounter doing chart review, review of test results, patient visit and documentation     ==========================

## 2021-05-27 NOTE — PROGRESS NOTES
Chief Complaint:   Painful erections, skin bridge on glans penis           Consult or Referral:     Mr. Sundeep Al is a 26 year old male seen at the request of Dr. Schwartz ref. provider found.         History of Present Illness:     Sundeep Al is a 26 year old male being seen for penile skin bridge.  Duration of problem: Many years  Previous treatments: None        Sundeep has been having difficulties during intercourse and review of the skin bridge which he believes has been there since his childhood.  It becomes more difficult with erections and penetration.  He wants to have a permanent solution for that             Past Medical History:     Past Medical History:   Diagnosis Date     NO ACTIVE PROBLEMS             Past Surgical History:     Past Surgical History:   Procedure Laterality Date     OPEN REDUCTION INTERNAL FIXATION FOREARM              Medications     No current outpatient medications on file.     No current facility-administered medications for this visit.             Family History:     Family History   Problem Relation Age of Onset     Family History Negative Mother      Family History Negative Father      Cancer Paternal Grandfather         prostate     Family History Negative Sister         1     Heart Disease No family hx of      Lipids No family hx of             Social History:     Social History     Socioeconomic History     Marital status: Single     Spouse name: Not on file     Number of children: Not on file     Years of education: Not on file     Highest education level: Not on file   Occupational History     Not on file   Social Needs     Financial resource strain: Not on file     Food insecurity     Worry: Not on file     Inability: Not on file     Transportation needs     Medical: Not on file     Non-medical: Not on file   Tobacco Use     Smoking status: Never Smoker     Smokeless tobacco: Never Used   Substance and Sexual Activity     Alcohol use: Yes     Comment: rare      "Drug use: No     Sexual activity: Yes   Lifestyle     Physical activity     Days per week: Not on file     Minutes per session: Not on file     Stress: Not on file   Relationships     Social connections     Talks on phone: Not on file     Gets together: Not on file     Attends Islam service: Not on file     Active member of club or organization: Not on file     Attends meetings of clubs or organizations: Not on file     Relationship status: Not on file     Intimate partner violence     Fear of current or ex partner: Not on file     Emotionally abused: Not on file     Physically abused: Not on file     Forced sexual activity: Not on file   Other Topics Concern     Parent/sibling w/ CABG, MI or angioplasty before 65F 55M? No   Social History Narrative     Not on file            Allergies:   Patient has no known allergies.         Review of Systems:  From intake questionnaire     Skin: negative  Eyes: negative  Ears/Nose/Throat: negative  Respiratory: No shortness of breath, dyspnea on exertion, cough, or hemoptysis  Cardiovascular: No chest pain or palpitations  Gastrointestinal: negative; no nausea/vomiting, constipation or diarrhea  Genitourinary: as per HPI  Musculoskeletal: negative  Neurologic: negative  Psychiatric: negative  Hematologic/Lymphatic/Immunologic: negative  Endocrine: negative         Physical Exam:     Patient is a 26 year old  male   Vitals: Blood pressure 92/62, height 1.753 m (5' 9\"), weight 68.9 kg (152 lb).  Constitutional: Body mass index is 22.45 kg/m .  Alert, no acute distress, oriented, conversant  Eyes: no scleral icterus; extraocular muscles intact, moist conjunctivae  Neck: trachea midline, no thyromegaly  Ears/nose/mouth: throat/mouth:normal, good dentition  Respiratory: no respiratory distress, or pursed lip breathing  Cardiovascular: pulses strong and intact; no obvious jugular venous distension present  Gastrointestinal: soft, nontender, no organomegaly or masses,   Lymphatics: " No inguinal adenopathy  Musculoskeletal: extremities normal, no peripheral edema  Skin: no suspicious lesions or rashes  Neuro: Alert, oriented, speech and mentation normal  Psych: affect and mood normal, alert and oriented to person, place and time  Gait: Normal  : Circumcised penis skin bridge on the dorsal aspect of the dense.      Labs and Pathology:    The following labs were reviewed by me and discussed with the patient:    Significant for   Lab Results   Component Value Date    CR 1.17 07/21/2018    CR 0.66 09/08/2009     No results found for: PSA                       Assessment and Plan:     Acquired synechiae of foreskin of penis  In view of the difficulty that Sundeep is facing with signing he resulting in that skin bridge will be discussed about the option of removing that under local anesthesia.  We could do that our next clinic visit.  I explained to Sundeep the procedure of penile block with excision of that skin bridge and suturing with reabsorbable sutures.  He is agreeable to the procedure and will schedule a time according to his availability.      Plan:  Excision of skin bridge    Orders  No orders of the defined types were placed in this encounter.      Mitchell Cruz MD  [facility] UROLOGY CLINIC [place]      ==========================      25 minutes spent on the date of the encounter doing chart review, review of test results, patient visit and documentation     ==========================

## 2021-06-17 ENCOUNTER — OFFICE VISIT (OUTPATIENT)
Dept: UROLOGY | Facility: CLINIC | Age: 26
End: 2021-06-17
Payer: COMMERCIAL

## 2021-06-17 VITALS
BODY MASS INDEX: 22.51 KG/M2 | OXYGEN SATURATION: 97 % | SYSTOLIC BLOOD PRESSURE: 132 MMHG | DIASTOLIC BLOOD PRESSURE: 68 MMHG | HEART RATE: 80 BPM | WEIGHT: 152 LBS | HEIGHT: 69 IN

## 2021-06-17 DIAGNOSIS — N48.9: Primary | ICD-10-CM

## 2021-06-17 PROCEDURE — 54162 LYSIS PENIL CIRCUMIC LESION: CPT | Performed by: STUDENT IN AN ORGANIZED HEALTH CARE EDUCATION/TRAINING PROGRAM

## 2021-06-17 RX ORDER — CEPHALEXIN 500 MG/1
500 CAPSULE ORAL 2 TIMES DAILY
Qty: 6 CAPSULE | Refills: 0 | Status: SHIPPED | OUTPATIENT
Start: 2021-06-17 | End: 2021-06-20

## 2021-06-17 ASSESSMENT — MIFFLIN-ST. JEOR: SCORE: 1664.85

## 2021-06-17 ASSESSMENT — PAIN SCALES - GENERAL: PAINLEVEL: NO PAIN (0)

## 2021-06-17 NOTE — LETTER
6/17/2021       RE: Sundeep Al  [address] [facility] [address]     Dear Colleague,    Thank you for referring your patient, Sundeep Al, to the [facility] UROLOGY CLINIC [place] at [facility]. Please see a copy of my visit note below.    Procedure notes:    Preoperative diagnosis: Penile synechia  Postoperative diagnosis: Same  Operative procedure performed: Excision of penile synechia.    Operative findings: Small skin bridge on the dorsum of the penile foreskin attaching the glans directly part of the prepuce.  Blood loss: Minimal    Operative procedure details:  Sundeep was laid supine on the operating table and we Prepared the part with Betadine.  1% lidocaine was used for local anesthesia around the base of the site.  Towards the proximal side.  After adequate anesthesia we excised skin bridge with sharp dissection with scissors and then closed on the side with interrupted chromic sutures.  Bacitracin ointment was applied and then covered with a dry gauze dressing.  We can remove the dressing in 24 hours and can shower as needed after that.  He will take a tablet of Keflex today and will see us as needed.      Again, thank you for allowing me to participate in the care of your patient.      Sincerely,    Mitchell Cruz MD

## 2021-06-22 NOTE — PROGRESS NOTES
Procedure notes:    Preoperative diagnosis: Penile synechia  Postoperative diagnosis: Same  Operative procedure performed: Excision of penile synechia.    Operative findings: Small skin bridge on the dorsum of the penile foreskin attaching the glans directly part of the prepuce.  Blood loss: Minimal    Operative procedure details:  Sundeep was laid supine on the operating table and we Prepared the part with Betadine.  1% lidocaine was used for local anesthesia around the base of the site.  Towards the proximal side.  After adequate anesthesia we excised skin bridge with sharp dissection with scissors and then closed on the side with interrupted chromic sutures.  Bacitracin ointment was applied and then covered with a dry gauze dressing.  We can remove the dressing in 24 hours and can shower as needed after that.  He will take a tablet of Keflex today and will see us as needed.

## 2021-10-24 ENCOUNTER — HEALTH MAINTENANCE LETTER (OUTPATIENT)
Age: 26
End: 2021-10-24

## 2022-06-14 ENCOUNTER — OFFICE VISIT (OUTPATIENT)
Dept: URGENT CARE | Facility: URGENT CARE | Age: 27
End: 2022-06-14
Payer: COMMERCIAL

## 2022-06-14 VITALS
WEIGHT: 157 LBS | BODY MASS INDEX: 23.18 KG/M2 | TEMPERATURE: 97.5 F | HEART RATE: 78 BPM | RESPIRATION RATE: 22 BRPM | DIASTOLIC BLOOD PRESSURE: 65 MMHG | OXYGEN SATURATION: 96 % | SYSTOLIC BLOOD PRESSURE: 138 MMHG

## 2022-06-14 DIAGNOSIS — U07.1 INFECTION DUE TO 2019 NOVEL CORONAVIRUS: ICD-10-CM

## 2022-06-14 DIAGNOSIS — R05.9 COUGH: ICD-10-CM

## 2022-06-14 DIAGNOSIS — R07.0 THROAT PAIN: Primary | ICD-10-CM

## 2022-06-14 LAB
DEPRECATED S PYO AG THROAT QL EIA: NEGATIVE
GROUP A STREP BY PCR: NOT DETECTED

## 2022-06-14 PROCEDURE — 99213 OFFICE O/P EST LOW 20 MIN: CPT | Performed by: PHYSICIAN ASSISTANT

## 2022-06-14 PROCEDURE — 87651 STREP A DNA AMP PROBE: CPT | Performed by: PHYSICIAN ASSISTANT

## 2022-06-14 RX ORDER — BENZONATATE 100 MG/1
100 CAPSULE ORAL 3 TIMES DAILY PRN
Qty: 30 CAPSULE | Refills: 0 | Status: SHIPPED | OUTPATIENT
Start: 2022-06-14 | End: 2022-06-24

## 2022-06-14 NOTE — PROGRESS NOTES
Chief Complaint   Patient presents with     Pharyngitis     Really bad sore throat-was exposure to covid last Tuesday, tested negative on Wednesday and Thursday but positive on Saturday, still cough          Results for orders placed or performed in visit on 06/14/22   Streptococcus A Rapid Screen w/Reflex to PCR - Clinic Collect     Status: Normal    Specimen: Throat; Swab   Result Value Ref Range    Group A Strep antigen Negative Negative             ASSESSMENT:     ICD-10-CM    1. Throat pain  R07.0 Streptococcus A Rapid Screen w/Reflex to PCR - Clinic Collect     benzonatate (TESSALON) 100 MG capsule     Group A Streptococcus PCR Throat Swab   2. Infection due to 2019 novel coronavirus  U07.1    3. Cough  R05.9            PLAN: Vital signs stable.  Rapid strep test negative.  Further strep test pending.  Salt water gargles.  Tessalon Perles.  I have discussed clinical findings with patient.  Side effects of medications discussed.  Symptomatic care is discussed.  I have discussed the possibility of  worsening symptoms and indication to RTC or go to the ER if they occur.  All questions are answered, patient indicates understanding of these issues and is in agreement with plan.   Patient care instructions are discussed/given at the end of visit.   Lots of rest and fluids.      Dotty Carreon PA-C      SUBJECTIVE:  Diagnosed with COVID approximately 3 days ago.  Significant sore throat.  Still coughing.  No vomiting or diarrhea.  No fever.  No rash.  No nausea or vomiting      No Known Allergies    Past Medical History:   Diagnosis Date     NO ACTIVE PROBLEMS        No current outpatient medications on file prior to visit.  No current facility-administered medications on file prior to visit.      Social History     Tobacco Use     Smoking status: Never Smoker     Smokeless tobacco: Never Used   Substance Use Topics     Alcohol use: Yes     Comment: rare       ROS:  CONSTITUTIONAL: Negative for fatigue or  fever.  EYES: Negative for eye problems.  ENT: As above.  RESP: As above.  CV: Negative for chest pains.  GI: Negative for vomiting.  MUSCULOSKELETAL:  Negative for significant muscle or joint pains.  NEUROLOGIC: Negative for headaches.  SKIN: Negative for rash.  PSYCH: Normal mentation for age.    OBJECTIVE:  /65 (BP Location: Left arm, Patient Position: Sitting, Cuff Size: Adult Regular)   Pulse 78   Temp 97.5  F (36.4  C) (Tympanic)   Resp 22   Wt 71.2 kg (157 lb)   SpO2 96%   BMI 23.18 kg/m    GENERAL APPEARANCE: Healthy, alert and no distress.  EYES:Conjunctiva/sclera clear.  EARS: No cerumen.   Ear canals w/o erythema.  TM's intact w/o erythema.    NOSE/MOUTH: Nose without ulcers, erythema or lesions.  SINUSES: No maxillary sinus tenderness.  THROAT: Moderate erythema w/o tonsillar enlargement . No exudates.  NECK: Supple, nontender, no lymphadenopathy.  RESP: Lungs clear to auscultation - no rales, rhonchi or wheezes  CV: Regular rate and rhythm, normal S1 S2, no murmur noted.  NEURO: Awake, alert    SKIN: No rashes        Dotty Carreon PA-C

## 2022-10-15 ENCOUNTER — HEALTH MAINTENANCE LETTER (OUTPATIENT)
Age: 27
End: 2022-10-15

## 2022-12-03 ENCOUNTER — HEALTH MAINTENANCE LETTER (OUTPATIENT)
Age: 27
End: 2022-12-03

## 2024-11-03 ASSESSMENT — PATIENT HEALTH QUESTIONNAIRE - PHQ9: SUM OF ALL RESPONSES TO PHQ QUESTIONS 1-9: 0

## 2025-01-25 ENCOUNTER — HEALTH MAINTENANCE LETTER (OUTPATIENT)
Age: 30
End: 2025-01-25